# Patient Record
Sex: MALE | Race: WHITE | NOT HISPANIC OR LATINO | Employment: FULL TIME | URBAN - METROPOLITAN AREA
[De-identification: names, ages, dates, MRNs, and addresses within clinical notes are randomized per-mention and may not be internally consistent; named-entity substitution may affect disease eponyms.]

---

## 2021-04-14 DIAGNOSIS — Z23 ENCOUNTER FOR IMMUNIZATION: ICD-10-CM

## 2022-09-22 ENCOUNTER — APPOINTMENT (OUTPATIENT)
Dept: LAB | Facility: HOSPITAL | Age: 57
End: 2022-09-22
Attending: PHYSICIAN ASSISTANT
Payer: COMMERCIAL

## 2022-09-22 ENCOUNTER — TRANSCRIBE ORDERS (OUTPATIENT)
Dept: REGISTRATION | Facility: HOSPITAL | Age: 57
End: 2022-09-22

## 2022-09-22 ENCOUNTER — HOSPITAL ENCOUNTER (OUTPATIENT)
Dept: CARDIOLOGY | Facility: HOSPITAL | Age: 57
Discharge: HOME | End: 2022-09-22
Attending: PHYSICIAN ASSISTANT
Payer: COMMERCIAL

## 2022-09-22 DIAGNOSIS — Z01.818 ENCOUNTER FOR OTHER PREPROCEDURAL EXAMINATION: ICD-10-CM

## 2022-09-22 DIAGNOSIS — Z01.818 ENCOUNTER FOR OTHER PREPROCEDURAL EXAMINATION: Primary | ICD-10-CM

## 2022-09-22 LAB
ABO + RH BLD: NORMAL
ALBUMIN SERPL-MCNC: 3.7 G/DL (ref 3.4–5)
ALP SERPL-CCNC: 69 IU/L (ref 35–126)
ALT SERPL-CCNC: 25 IU/L (ref 16–63)
ANION GAP SERPL CALC-SCNC: 8 MEQ/L (ref 3–15)
APTT PPP: 23 SEC (ref 23–35)
AST SERPL-CCNC: 20 IU/L (ref 15–41)
BILIRUB SERPL-MCNC: 0.8 MG/DL (ref 0.3–1.2)
BLD GP AB SCN SERPL QL: NEGATIVE
BLOOD BANK CMNT PATIENT-IMP: NORMAL
BUN SERPL-MCNC: 22 MG/DL (ref 8–20)
CALCIUM SERPL-MCNC: 9.4 MG/DL (ref 8.9–10.3)
CHLORIDE SERPL-SCNC: 103 MEQ/L (ref 98–109)
CO2 SERPL-SCNC: 29 MEQ/L (ref 22–32)
CREAT SERPL-MCNC: 1.4 MG/DL (ref 0.8–1.3)
CRP SERPL-MCNC: 8.6 MG/L
D AG BLD QL: POSITIVE
ERYTHROCYTE [DISTWIDTH] IN BLOOD BY AUTOMATED COUNT: 14.2 % (ref 11.6–14.4)
ERYTHROCYTE [SEDIMENTATION RATE] IN BLOOD BY WESTERGREN METHOD: <3 MM/HR
EST. AVERAGE GLUCOSE BLD GHB EST-MCNC: 111 MG/DL
GFR SERPL CREATININE-BSD FRML MDRD: 52.1 ML/MIN/1.73M*2
GLUCOSE SERPL-MCNC: 92 MG/DL (ref 70–99)
HBA1C MFR BLD HPLC: 5.5 %
HCT VFR BLDCO AUTO: 41.9 % (ref 40.1–51)
HGB BLD-MCNC: 14 G/DL (ref 13.7–17.5)
INR PPP: 1
LABORATORY COMMENT REPORT: NORMAL
MCH RBC QN AUTO: 31.6 PG (ref 28–33.2)
MCHC RBC AUTO-ENTMCNC: 33.4 G/DL (ref 32.2–36.5)
MCV RBC AUTO: 94.6 FL (ref 83–98)
PDW BLD AUTO: 10.4 FL (ref 9.4–12.4)
PLATELET # BLD AUTO: 232 K/UL (ref 150–350)
POTASSIUM SERPL-SCNC: 4.7 MEQ/L (ref 3.6–5.1)
PROT SERPL-MCNC: 5.4 G/DL (ref 6–8.2)
PROTHROMBIN TIME: 13.2 SEC (ref 12.2–14.5)
RBC # BLD AUTO: 4.43 M/UL (ref 4.5–5.8)
SODIUM SERPL-SCNC: 140 MEQ/L (ref 136–144)
SPECIMEN EXP DATE BLD: NORMAL
WBC # BLD AUTO: 9.94 K/UL (ref 3.8–10.5)

## 2022-09-22 PROCEDURE — 85610 PROTHROMBIN TIME: CPT

## 2022-09-22 PROCEDURE — 85027 COMPLETE CBC AUTOMATED: CPT

## 2022-09-22 PROCEDURE — 85730 THROMBOPLASTIN TIME PARTIAL: CPT

## 2022-09-22 PROCEDURE — 85652 RBC SED RATE AUTOMATED: CPT

## 2022-09-22 PROCEDURE — 86140 C-REACTIVE PROTEIN: CPT

## 2022-09-22 PROCEDURE — 86900 BLOOD TYPING SEROLOGIC ABO: CPT

## 2022-09-22 PROCEDURE — 83036 HEMOGLOBIN GLYCOSYLATED A1C: CPT

## 2022-09-22 PROCEDURE — 86901 BLOOD TYPING SEROLOGIC RH(D): CPT

## 2022-09-22 PROCEDURE — 86850 RBC ANTIBODY SCREEN: CPT

## 2022-09-22 PROCEDURE — 36415 COLL VENOUS BLD VENIPUNCTURE: CPT

## 2022-09-22 PROCEDURE — 93005 ELECTROCARDIOGRAM TRACING: CPT

## 2022-09-22 PROCEDURE — 80053 COMPREHEN METABOLIC PANEL: CPT

## 2022-09-23 ENCOUNTER — TRANSCRIBE ORDERS (OUTPATIENT)
Dept: REGISTRATION | Facility: HOSPITAL | Age: 57
End: 2022-09-23

## 2022-09-23 ENCOUNTER — APPOINTMENT (OUTPATIENT)
Dept: PREADMISSION TESTING | Facility: HOSPITAL | Age: 57
End: 2022-09-23
Payer: COMMERCIAL

## 2022-09-23 ENCOUNTER — APPOINTMENT (OUTPATIENT)
Dept: LAB | Facility: HOSPITAL | Age: 57
End: 2022-09-23
Attending: ORTHOPAEDIC SURGERY
Payer: COMMERCIAL

## 2022-09-23 VITALS — HEIGHT: 69 IN | BODY MASS INDEX: 25.03 KG/M2 | WEIGHT: 169 LBS

## 2022-09-23 DIAGNOSIS — Z01.818 ENCOUNTER FOR OTHER PREPROCEDURAL EXAMINATION: ICD-10-CM

## 2022-09-23 DIAGNOSIS — T84.84XA PAIN DUE TO INTERNAL ORTHOPEDIC PROSTHETIC DEVICES, IMPLANTS AND GRAFTS, INITIAL ENCOUNTER (CMS/HCC): Primary | ICD-10-CM

## 2022-09-23 DIAGNOSIS — T84.84XA PAIN DUE TO INTERNAL ORTHOPEDIC PROSTHETIC DEVICES, IMPLANTS AND GRAFTS, INITIAL ENCOUNTER (CMS/HCC): ICD-10-CM

## 2022-09-23 LAB
ATRIAL RATE: 65
P AXIS: 16
PR INTERVAL: 192
QRS DURATION: 78
QT INTERVAL: 392
QTC CALCULATION(BAZETT): 407
R AXIS: 1
SARS-COV-2 RNA RESP QL NAA+PROBE: NEGATIVE
T WAVE AXIS: 41
VENTRICULAR RATE: 65

## 2022-09-23 PROCEDURE — U0003 INFECTIOUS AGENT DETECTION BY NUCLEIC ACID (DNA OR RNA); SEVERE ACUTE RESPIRATORY SYNDROME CORONAVIRUS 2 (SARS-COV-2) (CORONAVIRUS DISEASE [COVID-19]), AMPLIFIED PROBE TECHNIQUE, MAKING USE OF HIGH THROUGHPUT TECHNOLOGIES AS DESCRIBED BY CMS-2020-01-R: HCPCS

## 2022-09-23 PROCEDURE — C9803 HOPD COVID-19 SPEC COLLECT: HCPCS

## 2022-09-23 RX ORDER — LATANOPROST 50 UG/ML
SOLUTION/ DROPS OPHTHALMIC DAILY
COMMUNITY

## 2022-09-23 RX ORDER — BACLOFEN 10 MG/1
10 TABLET ORAL
COMMUNITY
Start: 2020-01-01

## 2022-09-23 RX ORDER — ALLOPURINOL 300 MG/1
1 TABLET ORAL
COMMUNITY
Start: 2021-01-01

## 2022-09-23 RX ORDER — SENNOSIDES 8.6 MG/1
1 TABLET ORAL DAILY
COMMUNITY

## 2022-09-23 RX ORDER — MONTELUKAST SODIUM 10 MG/1
10 TABLET ORAL
COMMUNITY
Start: 2022-09-16

## 2022-09-23 RX ORDER — DOCUSATE SODIUM 100 MG/1
100 CAPSULE, LIQUID FILLED ORAL 2 TIMES DAILY
COMMUNITY

## 2022-09-23 RX ORDER — LISINOPRIL 40 MG/1
40 TABLET ORAL DAILY
COMMUNITY
Start: 2022-09-16

## 2022-09-23 RX ORDER — TRIAMCINOLONE ACETONIDE 55 UG/1
SPRAY, METERED NASAL
COMMUNITY

## 2022-09-23 RX ORDER — FAMOTIDINE 40 MG/1
40 TABLET, FILM COATED ORAL 2 TIMES DAILY
COMMUNITY
Start: 2020-01-01 | End: 2022-12-16

## 2022-09-23 RX ORDER — ACETAMINOPHEN 500 MG
1 TABLET ORAL
COMMUNITY

## 2022-09-23 RX ORDER — CETIRIZINE HYDROCHLORIDE 10 MG/1
TABLET ORAL
COMMUNITY

## 2022-09-23 RX ORDER — LAMOTRIGINE 100 MG/1
100 TABLET ORAL
COMMUNITY
Start: 2020-01-01

## 2022-09-23 RX ORDER — ALBUTEROL SULFATE 90 UG/1
2 INHALANT RESPIRATORY (INHALATION)
COMMUNITY
Start: 2022-02-12

## 2022-09-23 RX ORDER — ASPIRIN 81 MG/1
1 TABLET ORAL DAILY
Status: ON HOLD | COMMUNITY
End: 2022-09-28 | Stop reason: SDUPTHER

## 2022-09-23 RX ORDER — HYDROCHLOROTHIAZIDE 12.5 MG/1
CAPSULE ORAL
COMMUNITY
Start: 2020-01-01

## 2022-09-23 RX ORDER — LORAZEPAM 0.5 MG/1
0.5 TABLET ORAL
COMMUNITY
Start: 2020-01-01

## 2022-09-23 RX ORDER — LAMOTRIGINE 25 MG/1
1 TABLET, EXTENDED RELEASE ORAL DAILY
COMMUNITY
Start: 2022-08-16

## 2022-09-23 RX ORDER — ROSUVASTATIN CALCIUM 10 MG/1
10 TABLET, COATED ORAL DAILY
COMMUNITY
Start: 2022-09-16 | End: 2023-09-16

## 2022-09-23 NOTE — PRE-PROCEDURE INSTRUCTIONS
1. We will call you between 3 pm and 7 pm on 09/26/22 to inform you of arrival time for your procedure. If you do not hear by 6PM, please call [phone] for arrival time.     2. Please arrive through the Main Entrance of the [facility] (across from the parking garage) and report to the Surgery Registration Desk on the day of your procedure.    3. Please follow the following fasting guidelines:     No solid food EIGHT HOURS prior to surgery.  Unlimited clear liquids, meaning water or PLAIN black coffee WITHOUT any milk, cream, sugar, or sweetener are permitted up to TWO HOURS prior to arrival at the hospital.    4. Early on the morning of the procedure please take your usual dose of the listed medications with a sip of water:  Allopurinol  Zyrtec  pepcid  Lamotrigine  No lisinopril  Ativan  singulair  No hctz  5. Other Instructions: You may brush your teeth the morning of the procedure. Rinse and spit, do not swallow.  Bring a list of your medications with dosages.  Use surgical wash as directed.     6. If you develop a cold, cough, fever, rash, or other symptom prior to the day of the procedure, please report it to your physician immediately.    7. If you need to cancel the procedure for any reason, please contact your physician.    8. Make arrangements to have someone drive you home from the procedure. If you have not arranged for transportation home, your surgery may be cancelled.     9. You may not take public transportation unless accompanied by a responsible person.    10. You may not drive a car or operate complex or potentially dangerous machinery for 24 hours following anesthesia and/or sedation.    11.  If it is medically necessary for you to have a longer stay, you will be informed as soon as the decision is made.    12. Only bring essential items to the hospital.  Do not wear or bring anything of value to the hospital including jewelry of any kind, money, or wallet. Do not wear make-up or contact lenses.   DO NOT BRING MEDICATIONS FROM HOME unless instructed to do so. DO bring your hearing aids, glasses, and a case    13. No lotion, creams, powders, or oils on skin the morning of procedure     14. Dress in comfortable clothes.    15.  If instructed, please bring a copy of your Advanced Directive (Living Will/Durable Power of ) on the day of your procedure.     16. Patients need to quarantine from the time of PAT COVID test to day of surgery, regardless of COVID vaccine status.      17. Ensuring your safety at all times is a very important part of our [facility] Culture of Safety. After having surgery and sedation, you are at risk for falling and balance issues. Although you may feel awake, the effects of the medication can last up to 24 hours after anesthesia. If you need to use the bathroom during your recovery period, nursing staff will escort you there and stay with you to ensure your safety.    18. Refrain from drinking alcohol and smoking cigarettes for 24 hours prior to surgery.    19. Shower with antibacterial soap (Dial) the night before and morning of your procedure.  If your procedure indicates the need for CHG antiseptic wash (Bactoshield or Hibiclens), please use this instead and follow instructions as discussed at the time of your Pre-Admission Testing phone interview or visit.    Above instructions reviewed with patient and patient acknowledges understanding.    Form explained by: Gisselle Hernández RN

## 2022-09-26 ENCOUNTER — ANESTHESIA EVENT (OUTPATIENT)
Dept: OPERATING ROOM | Facility: HOSPITAL | Age: 57
Setting detail: HOSPITAL OUTPATIENT SURGERY
End: 2022-09-26
Payer: COMMERCIAL

## 2022-09-27 ENCOUNTER — HOSPITAL ENCOUNTER (OUTPATIENT)
Facility: HOSPITAL | Age: 57
Discharge: HOME | End: 2022-09-28
Attending: ORTHOPAEDIC SURGERY | Admitting: ORTHOPAEDIC SURGERY
Payer: COMMERCIAL

## 2022-09-27 ENCOUNTER — APPOINTMENT (OUTPATIENT)
Dept: RADIOLOGY | Facility: HOSPITAL | Age: 57
End: 2022-09-27
Attending: ORTHOPAEDIC SURGERY
Payer: COMMERCIAL

## 2022-09-27 ENCOUNTER — ANESTHESIA (OUTPATIENT)
Dept: OPERATING ROOM | Facility: HOSPITAL | Age: 57
Setting detail: HOSPITAL OUTPATIENT SURGERY
End: 2022-09-27
Payer: COMMERCIAL

## 2022-09-27 DIAGNOSIS — T84.84XA PAIN DUE TO TOTAL RIGHT KNEE REPLACEMENT, INITIAL ENCOUNTER (CMS/HCC): ICD-10-CM

## 2022-09-27 DIAGNOSIS — Z96.651 PAIN DUE TO TOTAL RIGHT KNEE REPLACEMENT, INITIAL ENCOUNTER (CMS/HCC): ICD-10-CM

## 2022-09-27 PROCEDURE — C1776 JOINT DEVICE (IMPLANTABLE): HCPCS | Performed by: ORTHOPAEDIC SURGERY

## 2022-09-27 PROCEDURE — 63600000 HC DRUGS/DETAIL CODE: Performed by: ORTHOPAEDIC SURGERY

## 2022-09-27 PROCEDURE — 73560 X-RAY EXAM OF KNEE 1 OR 2: CPT | Mod: RT

## 2022-09-27 PROCEDURE — 0SRC069 REPLACEMENT OF RIGHT KNEE JOINT WITH OXIDIZED ZIRCONIUM ON POLYETHYLENE SYNTHETIC SUBSTITUTE, CEMENTED, OPEN APPROACH: ICD-10-PCS | Performed by: ORTHOPAEDIC SURGERY

## 2022-09-27 PROCEDURE — 25000000 HC PHARMACY GENERAL: Performed by: NURSE ANESTHETIST, CERTIFIED REGISTERED

## 2022-09-27 PROCEDURE — 63600000 HC DRUGS/DETAIL CODE: Performed by: NURSE ANESTHETIST, CERTIFIED REGISTERED

## 2022-09-27 PROCEDURE — C1713 ANCHOR/SCREW BN/BN,TIS/BN: HCPCS | Performed by: ORTHOPAEDIC SURGERY

## 2022-09-27 PROCEDURE — 27200000 HC STERILE SUPPLY: Performed by: ORTHOPAEDIC SURGERY

## 2022-09-27 PROCEDURE — 25000000 HC PHARMACY GENERAL: Performed by: ORTHOPAEDIC SURGERY

## 2022-09-27 PROCEDURE — 36415 COLL VENOUS BLD VENIPUNCTURE: CPT | Performed by: ORTHOPAEDIC SURGERY

## 2022-09-27 PROCEDURE — 0SPC0JZ REMOVAL OF SYNTHETIC SUBSTITUTE FROM RIGHT KNEE JOINT, OPEN APPROACH: ICD-10-PCS | Performed by: ORTHOPAEDIC SURGERY

## 2022-09-27 PROCEDURE — 36000015 HC OR LEVEL 5 EA ADDL MIN: Performed by: ORTHOPAEDIC SURGERY

## 2022-09-27 PROCEDURE — 87070 CULTURE OTHR SPECIMN AEROBIC: CPT | Performed by: ORTHOPAEDIC SURGERY

## 2022-09-27 PROCEDURE — 25000000 HC PHARMACY GENERAL: Performed by: ANESTHESIOLOGY

## 2022-09-27 PROCEDURE — 71000011 HC PACU PHASE 1 EA ADDL MIN: Performed by: ORTHOPAEDIC SURGERY

## 2022-09-27 PROCEDURE — 25800000 HC PHARMACY IV SOLUTIONS: Performed by: ORTHOPAEDIC SURGERY

## 2022-09-27 PROCEDURE — 36000005 HC OR LEVEL 5 INITIAL 30MIN: Performed by: ORTHOPAEDIC SURGERY

## 2022-09-27 PROCEDURE — 71000001 HC PACU PHASE 1 INITIAL 30MIN: Performed by: ORTHOPAEDIC SURGERY

## 2022-09-27 PROCEDURE — 37000010 HC ANESTHESIA SPINAL: Performed by: ORTHOPAEDIC SURGERY

## 2022-09-27 PROCEDURE — 63700000 HC SELF-ADMINISTRABLE DRUG: Performed by: ORTHOPAEDIC SURGERY

## 2022-09-27 PROCEDURE — 63600000 HC DRUGS/DETAIL CODE: Performed by: ANESTHESIOLOGY

## 2022-09-27 DEVICE — LEGION CONSTRAINED ARTICULAR                                    INSERT 9MM SIZE 5-6
Type: IMPLANTABLE DEVICE | Site: KNEE | Status: FUNCTIONAL
Brand: LEGION

## 2022-09-27 DEVICE — LEGION REVISION TIBIAL BASEPLATE                                    SIZE 5 RIGHT
Type: IMPLANTABLE DEVICE | Site: KNEE | Status: FUNCTIONAL
Brand: LEGION

## 2022-09-27 DEVICE — IMPLANTABLE DEVICE: Type: IMPLANTABLE DEVICE | Site: KNEE | Status: FUNCTIONAL

## 2022-09-27 DEVICE — CEMENT BONE SIMPLEX W/TOBRAMYCIN: Type: IMPLANTABLE DEVICE | Site: KNEE | Status: FUNCTIONAL

## 2022-09-27 DEVICE — LEGION CEMENTED STEM 16MM X 120MM STRAIGHT
Type: IMPLANTABLE DEVICE | Site: KNEE | Status: FUNCTIONAL
Brand: LEGION

## 2022-09-27 DEVICE — LEGION COBALT CHROME CONSTRAINED                                    FEMORAL SIZE 6 RIGHT
Type: IMPLANTABLE DEVICE | Site: KNEE | Status: FUNCTIONAL
Brand: LEGION

## 2022-09-27 DEVICE — CEMENTED STEM STRAIGHT 14MMX120MM: Type: IMPLANTABLE DEVICE | Site: KNEE | Status: FUNCTIONAL

## 2022-09-27 RX ORDER — DEXAMETHASONE SODIUM PHOSPHATE 4 MG/ML
10 INJECTION, SOLUTION INTRA-ARTICULAR; INTRALESIONAL; INTRAMUSCULAR; INTRAVENOUS; SOFT TISSUE DAILY
Status: DISCONTINUED | OUTPATIENT
Start: 2022-09-27 | End: 2022-09-28 | Stop reason: HOSPADM

## 2022-09-27 RX ORDER — SODIUM CHLORIDE 9 MG/ML
INJECTION, SOLUTION INTRAVENOUS CONTINUOUS
Status: DISCONTINUED | OUTPATIENT
Start: 2022-09-27 | End: 2022-09-28 | Stop reason: HOSPADM

## 2022-09-27 RX ORDER — VANCOMYCIN HYDROCHLORIDE 1 G/20ML
INJECTION, POWDER, LYOPHILIZED, FOR SOLUTION INTRAVENOUS
Status: DISCONTINUED | OUTPATIENT
Start: 2022-09-27 | End: 2022-09-27 | Stop reason: HOSPADM

## 2022-09-27 RX ORDER — ONDANSETRON 8 MG/1
8 TABLET, ORALLY DISINTEGRATING ORAL ONCE
Status: COMPLETED | OUTPATIENT
Start: 2022-09-27 | End: 2022-09-27

## 2022-09-27 RX ORDER — PROPOFOL 10 MG/ML
INJECTION, EMULSION INTRAVENOUS CONTINUOUS PRN
Status: DISCONTINUED | OUTPATIENT
Start: 2022-09-27 | End: 2022-09-28 | Stop reason: SURG

## 2022-09-27 RX ORDER — PREGABALIN 150 MG/1
150 CAPSULE ORAL ONCE
Status: COMPLETED | OUTPATIENT
Start: 2022-09-27 | End: 2022-09-27

## 2022-09-27 RX ORDER — IBUPROFEN 200 MG
16-32 TABLET ORAL AS NEEDED
Status: DISCONTINUED | OUTPATIENT
Start: 2022-09-27 | End: 2022-09-28 | Stop reason: HOSPADM

## 2022-09-27 RX ORDER — CELECOXIB 200 MG/1
400 CAPSULE ORAL ONCE
Status: COMPLETED | OUTPATIENT
Start: 2022-09-27 | End: 2022-09-27

## 2022-09-27 RX ORDER — CEFAZOLIN SODIUM/WATER 2 G/20 ML
2 SYRINGE (ML) INTRAVENOUS ONCE
Status: COMPLETED | OUTPATIENT
Start: 2022-09-27 | End: 2022-09-27

## 2022-09-27 RX ORDER — KETOROLAC TROMETHAMINE 30 MG/ML
30 INJECTION, SOLUTION INTRAMUSCULAR; INTRAVENOUS
Status: DISCONTINUED | OUTPATIENT
Start: 2022-09-27 | End: 2022-09-28 | Stop reason: HOSPADM

## 2022-09-27 RX ORDER — DEXTROSE 50 % IN WATER (D50W) INTRAVENOUS SYRINGE
25 AS NEEDED
Status: DISCONTINUED | OUTPATIENT
Start: 2022-09-27 | End: 2022-09-28 | Stop reason: HOSPADM

## 2022-09-27 RX ORDER — ONDANSETRON HYDROCHLORIDE 2 MG/ML
4 INJECTION, SOLUTION INTRAVENOUS
Status: DISCONTINUED | OUTPATIENT
Start: 2022-09-27 | End: 2022-09-27 | Stop reason: HOSPADM

## 2022-09-27 RX ORDER — PROPOFOL 10 MG/ML
INJECTION, EMULSION INTRAVENOUS AS NEEDED
Status: DISCONTINUED | OUTPATIENT
Start: 2022-09-27 | End: 2022-09-28 | Stop reason: SURG

## 2022-09-27 RX ORDER — MIDAZOLAM HYDROCHLORIDE 2 MG/2ML
INJECTION, SOLUTION INTRAMUSCULAR; INTRAVENOUS AS NEEDED
Status: DISCONTINUED | OUTPATIENT
Start: 2022-09-27 | End: 2022-09-28

## 2022-09-27 RX ORDER — HYDROMORPHONE HYDROCHLORIDE 1 MG/ML
0.25 INJECTION, SOLUTION INTRAMUSCULAR; INTRAVENOUS; SUBCUTANEOUS EVERY 4 HOURS PRN
Status: DISCONTINUED | OUTPATIENT
Start: 2022-09-27 | End: 2022-09-28

## 2022-09-27 RX ORDER — TRAMADOL HYDROCHLORIDE 50 MG/1
50 TABLET ORAL 4 TIMES DAILY
Status: DISCONTINUED | OUTPATIENT
Start: 2022-09-27 | End: 2022-09-28 | Stop reason: HOSPADM

## 2022-09-27 RX ORDER — POLYETHYLENE GLYCOL 3350 17 G/17G
17 POWDER, FOR SOLUTION ORAL DAILY
Status: DISCONTINUED | OUTPATIENT
Start: 2022-09-28 | End: 2022-09-28 | Stop reason: HOSPADM

## 2022-09-27 RX ORDER — NAPROXEN SODIUM 220 MG/1
81 TABLET, FILM COATED ORAL 2 TIMES DAILY
Status: DISCONTINUED | OUTPATIENT
Start: 2022-09-27 | End: 2022-09-28 | Stop reason: HOSPADM

## 2022-09-27 RX ORDER — AMOXICILLIN 250 MG
1 CAPSULE ORAL 2 TIMES DAILY
Status: DISCONTINUED | OUTPATIENT
Start: 2022-09-27 | End: 2022-09-28 | Stop reason: HOSPADM

## 2022-09-27 RX ORDER — LIDOCAINE HYDROCHLORIDE 10 MG/ML
INJECTION, SOLUTION INFILTRATION; PERINEURAL AS NEEDED
Status: DISCONTINUED | OUTPATIENT
Start: 2022-09-27 | End: 2022-09-28 | Stop reason: SURG

## 2022-09-27 RX ORDER — OXYCODONE HYDROCHLORIDE 5 MG/1
5 TABLET ORAL EVERY 4 HOURS PRN
Status: DISCONTINUED | OUTPATIENT
Start: 2022-09-27 | End: 2022-09-28 | Stop reason: HOSPADM

## 2022-09-27 RX ORDER — OXYCODONE HCL 20 MG/1
20 TABLET, FILM COATED, EXTENDED RELEASE ORAL ONCE
Status: COMPLETED | OUTPATIENT
Start: 2022-09-27 | End: 2022-09-27

## 2022-09-27 RX ORDER — ALUMINUM HYDROXIDE, MAGNESIUM HYDROXIDE, AND SIMETHICONE 1200; 120; 1200 MG/30ML; MG/30ML; MG/30ML
30 SUSPENSION ORAL EVERY 4 HOURS PRN
Status: DISCONTINUED | OUTPATIENT
Start: 2022-09-27 | End: 2022-09-28 | Stop reason: HOSPADM

## 2022-09-27 RX ORDER — DEXAMETHASONE SODIUM PHOSPHATE 4 MG/ML
INJECTION, SOLUTION INTRA-ARTICULAR; INTRALESIONAL; INTRAMUSCULAR; INTRAVENOUS; SOFT TISSUE AS NEEDED
Status: DISCONTINUED | OUTPATIENT
Start: 2022-09-27 | End: 2022-09-28 | Stop reason: SURG

## 2022-09-27 RX ORDER — ONDANSETRON HYDROCHLORIDE 2 MG/ML
4 INJECTION, SOLUTION INTRAVENOUS EVERY 8 HOURS PRN
Status: DISCONTINUED | OUTPATIENT
Start: 2022-09-27 | End: 2022-09-28 | Stop reason: HOSPADM

## 2022-09-27 RX ORDER — FAMOTIDINE 20 MG/1
20 TABLET, FILM COATED ORAL ONCE
Status: DISCONTINUED | OUTPATIENT
Start: 2022-09-27 | End: 2022-09-27

## 2022-09-27 RX ORDER — BUPIVACAINE HYDROCHLORIDE 7.5 MG/ML
INJECTION, SOLUTION INTRASPINAL
Status: COMPLETED | OUTPATIENT
Start: 2022-09-27 | End: 2022-09-27

## 2022-09-27 RX ORDER — FENTANYL CITRATE 50 UG/ML
50 INJECTION, SOLUTION INTRAMUSCULAR; INTRAVENOUS
Status: DISCONTINUED | OUTPATIENT
Start: 2022-09-27 | End: 2022-09-27 | Stop reason: HOSPADM

## 2022-09-27 RX ORDER — SODIUM CHLORIDE 0.9 G/100ML
INJECTION, SOLUTION IRRIGATION
Status: DISCONTINUED | OUTPATIENT
Start: 2022-09-27 | End: 2022-09-27 | Stop reason: HOSPADM

## 2022-09-27 RX ORDER — DEXTROSE 40 %
15-30 GEL (GRAM) ORAL AS NEEDED
Status: DISCONTINUED | OUTPATIENT
Start: 2022-09-27 | End: 2022-09-28 | Stop reason: HOSPADM

## 2022-09-27 RX ADMIN — VANCOMYCIN HYDROCHLORIDE 1 G: 1 INJECTION, POWDER, LYOPHILIZED, FOR SOLUTION INTRAVENOUS at 14:28

## 2022-09-27 RX ADMIN — PREGABALIN 150 MG: 150 CAPSULE ORAL at 13:59

## 2022-09-27 RX ADMIN — TRANEXAMIC ACID 1500 MG: 100 INJECTION, SOLUTION INTRAVENOUS at 15:25

## 2022-09-27 RX ADMIN — BUPIVACAINE HYDROCHLORIDE IN DEXTROSE 2 ML: 7.5 INJECTION, SOLUTION SUBARACHNOID at 15:39

## 2022-09-27 RX ADMIN — SODIUM CHLORIDE: 9 INJECTION, SOLUTION INTRAVENOUS at 15:28

## 2022-09-27 RX ADMIN — PROPOFOL 20 MG: 10 INJECTION, EMULSION INTRAVENOUS at 15:35

## 2022-09-27 RX ADMIN — ONDANSETRON 8 MG: 8 TABLET, ORALLY DISINTEGRATING ORAL at 13:59

## 2022-09-27 RX ADMIN — Medication 2 G: at 15:36

## 2022-09-27 RX ADMIN — SODIUM CHLORIDE: 9 INJECTION, SOLUTION INTRAVENOUS at 21:41

## 2022-09-27 RX ADMIN — TRAMADOL HYDROCHLORIDE 50 MG: 50 TABLET, COATED ORAL at 23:22

## 2022-09-27 RX ADMIN — OXYCODONE HYDROCHLORIDE 5 MG: 5 TABLET ORAL at 23:22

## 2022-09-27 RX ADMIN — SODIUM CHLORIDE: 9 INJECTION, SOLUTION INTRAVENOUS at 14:34

## 2022-09-27 RX ADMIN — CELECOXIB 400 MG: 200 CAPSULE ORAL at 13:59

## 2022-09-27 RX ADMIN — MIDAZOLAM HYDROCHLORIDE 2 MG: 1 INJECTION, SOLUTION INTRAMUSCULAR; INTRAVENOUS at 15:25

## 2022-09-27 RX ADMIN — PROPOFOL 70 MCG/KG/MIN: 10 INJECTION, EMULSION INTRAVENOUS at 15:38

## 2022-09-27 RX ADMIN — KETOROLAC TROMETHAMINE 30 MG: 30 INJECTION, SOLUTION INTRAMUSCULAR at 23:21

## 2022-09-27 RX ADMIN — LIDOCAINE HYDROCHLORIDE 5 ML: 10 INJECTION, SOLUTION INFILTRATION; PERINEURAL at 15:35

## 2022-09-27 RX ADMIN — DEXAMETHASONE SODIUM PHOSPHATE 4 MG: 4 INJECTION, SOLUTION INTRA-ARTICULAR; INTRALESIONAL; INTRAMUSCULAR; INTRAVENOUS; SOFT TISSUE at 15:42

## 2022-09-27 RX ADMIN — ASPIRIN 81 MG CHEWABLE TABLET 81 MG: 81 TABLET CHEWABLE at 23:21

## 2022-09-27 RX ADMIN — OXYCODONE HYDROCHLORIDE 20 MG: 20 TABLET, FILM COATED, EXTENDED RELEASE ORAL at 13:59

## 2022-09-27 RX ADMIN — SENNOSIDES AND DOCUSATE SODIUM 1 TABLET: 50; 8.6 TABLET ORAL at 23:22

## 2022-09-27 RX ADMIN — FENTANYL CITRATE 50 MCG: 50 INJECTION, SOLUTION INTRAMUSCULAR; INTRAVENOUS at 20:45

## 2022-09-27 ASSESSMENT — COPD QUESTIONNAIRES: COPD: 1

## 2022-09-27 ASSESSMENT — ENCOUNTER SYMPTOMS: DEPRESSION: 1

## 2022-09-27 ASSESSMENT — PATIENT HEALTH QUESTIONNAIRE - PHQ9: SUM OF ALL RESPONSES TO PHQ9 QUESTIONS 1 & 2: 0

## 2022-09-27 NOTE — BRIEF OP NOTE
RIGHT KNEE TOTAL REVISION (R) Procedure Note    Procedure:    RIGHT KNEE TOTAL REVISION  CPT(R) Code:  58044 - NM REVISE KNEE JOINT REPLACE,ALL PARTS      Pre-op Diagnosis     * Pain due to total right knee replacement, initial encounter (CMS/[facility]) [T84.84XA, Z96.651]       Post-op Diagnosis     * Pain due to total right knee replacement, initial encounter (CMS/[facility]) [T84.84XA, Z96.651]    Surgeon(s) and Role:     * Himanshu Valdez MD - Primary     * Tristin Jackson DO - Resident - Assisting    Anesthesia: Choice    Staff:   Circulator: Susi Martinez, ROSA; Kylah Henson, ROSA; Lonnie Chang, ROSA; Eulalia Watts RN  Scrub Person: Ilana Juárez    Procedure Details   Revision right TKR    Estimated Blood Loss: No blood loss documented.    Specimens:                Order Name Source Comment Collection Info Order Time   ANAEROBIC CULTURE / SMEAR (INCLUDES AEROBIC CULTURE) Knee, Right Pre-op diagnosis:  Painful Total Knee T84.84xa Collected By: Himanshu Valdez MD 9/27/2022  4:12 PM     Release to patient   Immediate        REPEAT ABO/RH (TYPE CHECK) Blood, Venous   9/27/2022  2:57 PM     Release to patient   Immediate              Drains: * No LDAs found *    Implants:   Implant Name Type Inv. Item Serial No.  Lot No. LRB No. Used Action   CEMENT BONE SIMPLEX W/TOBRAMYCIN - [id number]  CEMENT BONE SIMPLEX W/TOBRAMYCIN  [employer] [id number] Right 3 Implanted   Prep-IM Enhanced Total Hip Preparation Kit    [facility] [id number] Right 1 Implanted   TIBIAL BASEPLATE LEGION REV SIZE 5/RIGHT - [id number]  TIBIAL BASEPLATE LEGION REV SIZE 5/RIGHT  [place] [id number] Right 1 Implanted   FEMORAL COMPONENT CONSTRAINED SIZE 6 RIGHT - [id number]  FEMORAL COMPONENT CONSTRAINED SIZE 6 [facility] [id number] Right 1 Implanted   SCREW-ON FEMORAL WEDGE 15MM SIZE 1-2 - [id number]  SCREW-ON FEMORAL WEDGE 15MM SIZE 1-2  [employer] [place] [id number] Right 1 Implanted   STEM CEMENTED LEGION STR 16 X 120 -  [id number]  STEM CEMENTED LEGION STR 16 X 120  [employer] [id number] Right 1 Implanted   ARTICULAR INSERT 9MM SIZE 5-6 CONSTRAINED - [id number]  ARTICULAR INSERT 9MM SIZE 5-6 CONSTRAINED  [employer] [id number] Right 1 Implanted   CEMENTED STEM STRAIGHT [id number] - [id number]  CEMENTED STEM STRAIGHT [id number]  [employer] [id number] Right 1 Implanted              Complications:  None; patient tolerated the procedure well.           Disposition: PACU - hemodynamically stable.           Condition: stable    Himanshu Valdez MD  Phone Number: [phone]

## 2022-09-27 NOTE — ANESTHESIOLOGIST PRE-PROCEDURE ATTESTATION
Pre-Procedure Patient Identification:  I am the Primary Anesthesiologist and have identified the patient on 09/27/22 at 2:59 PM.   I have confirmed the procedure(s) will be performed by the following surgeon/proceduralist Himanshu Valdez MD.

## 2022-09-27 NOTE — OR SURGEON
Pre-Procedure patient identification:  I am the primary operating surgeon/proceduralist and I have identified the patient and confirmed laterality is right on 09/27/22 at 2:44 PM Himanshu Valdez MD  Phone Number: [phone]

## 2022-09-27 NOTE — OP NOTE
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  Next the right knee was prepped and draped in usual sterile fashion. Next the limb was exsanguinated with an Esmarch bandage and the tourniquet was inflated to 350 mmHg. An anterior approach using the previous incision was carried out through the subcutaneous tissue down to the level of the fascia. The old scar was excised.  The fascia was then divided in a standard median parapatellar patella arthrotomy. Next the patella was inspected and found to be in excellent condition and left in place.  At this point in time scar surrounding the joint was removed and a complete synovectomy was performed.  At this point in time, using chisels osteotomes and other tools, the femoral and tibial components were removed with minimal bone loss.  Canals were reamed to 16 mm on the femoral side and 14 mm on the tibial side.  At this point in time, the saw was used to do debridements of the distal aspect of the femur and proximal part of the tibia in standard manner to freshen up the bone and normalize the angle of the cuts.  A trial reduction was now performed with the size 6 femur, with a 16 x 120 mm stem, the size 4 tibia, with a 14 x 120 mm stem, and a size 9 mm polyethylene insert.  A single distal medial augments were used.  These components were found to be quite satisfactory in terms of range of motion and stability and patellar tracking.  These size components were accepted, the canals were plugged, and this point in time cement was mixed and infiltrated into the canals and the ends of the bones.  Previously mentioned sized components for the Legion total knee replacement system by Smith & Nephewwere cemented into place.    Excess cement was removed from all areas and the knee was then evaluated for range of motion stability, kinematics, balance and patella tracking, all of which were satisfactory. At this point time dilute Betadine solutions placed into the wound for approximately 2 minutes and was then thoroughly irrigated and evacuated and then the wound was closed with #2 Quill the deep layer, O- Quill in the subcutaneous layer, and skin staples into the skin.  Finally prior to the completion of closure, dilute Marcaine solution was infiltrated into the shin-incisional tissues for postoperative pain control. A sterile compressive dressing was then placed and the tourniquet was then released the patient was then transferred to the recovery room in stable condition. I was present for the entire case.

## 2022-09-27 NOTE — DISCHARGE INSTRUCTIONS
KNEE REVISION      MEDICATION:    If you are taking Aspirin:  Enteric coated aspirin 2 times a day for blood clot prevention and take for 6 weeks.  May use over-the-counter Pepcid or Zantac if stomach upset occurs.    PAIN CONTROL:    You will be given a prescription for pain medication. Take your pain medicine as prescribed with food if possible. You can expect to have pain during the healing process from the incision and it will improve.     Opioid pain reliever is to be taken only as needed for pain.    DO NOT DRIVE WHILE TAKING PAIN MEDICATION.    Some patients find that they have some difficulty with constipation after surgery. This is due to a combination of things. Most of this is due to the pain medication you are taking. The pain medications, along with a decrease in activity, can sometimes lead to discomfort from constipation. You should eat plenty of fresh fruits, vegetables, drink fruit juices and drink plenty of water.    INCISION CARE:  Look at incision every day. Keep incision clean and dry.  Maintain Mepilex dressing for 5 days total.  Maintain MARIO stockings. May remove for showering and at night while sleeping.  If you have steri-strips, leave them on until they fall off.  Your staples or stitches will be removed about 18 to 24 days after surgery. Your incision will heal and the swelling and bruising will get better over the next 3 weeks.  If you have glue closing your incision, do NOT pull or pick off.  It will dissolve naturally.  You may shower but no tub baths, swimming, jacuzzi tubs, or any other activity that would require submersion of your incision in water.      Call your doctor if you notice any of the following:  Fever over 101.5 degrees  Drainage from incision  Increased swelling around incision  Increased redness around incision line  Thigh/calf pain or swelling in legs  Chest pain  Chest congestion  Problems with breathing    ACTIVITY:  You may progress to a cane when ready.  You may  put as much weight as you can tolerate on your operative leg (unless instructed otherwise).  Balance rest and activity.    DO NOT SMOKE! Smoking is not healthy for your healing process.  No driving for at least 3 weeks. You must also be off of prescription opioids.    KNEE PRECAUTIONS:  No pillow under the knee.  No twisting or kneeling.    FOLLOW Up:  Call now for an appointment in 3 weeks from your date of surgery with Dr. Valdez. [phone]        *FOR ANY ORTHOPEDIC ISSUES OR CONCERNS THAT NEED TO BE ADDRESSED IN PERSON, YOU MAY REPORT TO THE URGENT CARE LOCATED AT THE [facility] WHICH HAS EVENING AND WEEKEND HOURS, INSTEAD OF REPORTING TO THE EMERGENCY ROOM    For your and/or your 's information, important details about activity and expectations that were reviewed during your hospital stay can be at found in our Discharge Video overview at [email or web address]       [facility] They can be reached at [phone].  They will be out to see you within 24-48 hours.

## 2022-09-27 NOTE — ANESTHESIA PROCEDURE NOTES
Spinal Block    Patient location during procedure: OR  Start time: 9/27/2022 3:33 PM  End time: 9/27/2022 3:37 PM  Staffing  Performed: anesthesiologist   Anesthesiologist: Fabi Perez MD  Resident/CRNA: Mansi Suero CRNA  Reason for block: primary anesthetic  Preanesthetic Checklist  Completed: patient identified, site marked, surgical consent, pre-op evaluation, timeout performed, IV checked, risks and benefits discussed, monitors and equipment checked and sterile field maintained during procedure  Spinal Block  Patient position: sitting  Prep: ChloraPrep and site prepped and draped  Patient monitoring: heart rate, cardiac monitor, continuous pulse ox and blood pressure  Approach: midline  Location: L4-5  Injection technique: single-shot  Needle  Needle type: Sprotte   Needle gauge: 24 G  Needle length: 3.5 in  Assessment  Events: cerebrospinal fluid  Additional Notes  Procedure well tolerated. Vital signs stable.    Medications Administered -   bupivacaine PF (MARCAINE SPINAL) 0.75% intrathecal injection - intrathecal   2 mL - 9/27/2022 3:39:00 PM

## 2022-09-27 NOTE — H&P
Ortho H&P  Admitting Diagnosis:  Pain due to total right knee replacement, initial encounter (CMS/[facility]) [T84.84XA, Z96.651]  HPI       Hard copy H and P is available for review. There are no Changes and we may proceed.

## 2022-09-27 NOTE — ANESTHESIA PREPROCEDURE EVALUATION
Relevant Problems   No relevant active problems       Anesthesia ROS/MED HX      Pulmonary    asthma   COPD  Neuro/Psych    Depression   Anxiety  Cardiovascular   Covid19 Test Reviewed and ECG reviewed  GI/Hepatic   PUD   GERD  Musculoskeletal   Arthritis  Renal Disease   BPH  Endo/Other  History of cancer and prostate cancer  Body Habitus: Normal       Past Surgical History:   Procedure Laterality Date    COLONOSCOPY      ESOPHAGOGASTRODUODENOSCOPY      FOOT SURGERY      JOINT REPLACEMENT      KNEE ARTHROSCOPY      KNEE SURGERY      PROSTATECTOMY      REPLACEMENT TOTAL KNEE      ROTATOR CUFF REPAIR      SHOULDER SURGERY Bilateral     TOTAL HIP ARTHROPLASTY Bilateral        Physical Exam    Airway   Mallampati: II   TM distance: >3 FB   Neck ROM: full  Cardiovascular - normal   Rhythm: regular   Rate: normalPulmonary - normal   clear to auscultation  Dental - normal        Anesthesia Plan    Plan: spinal    Technique: spinal     Airway: natural airway / supplemental oxygen   ASA 3  Blood Products:     Use of Blood Products Discussed: Yes     Consented to blood products  Anesthetic plan and risks discussed with: patient  Postop Plan:   Patient Disposition: inpatient floor planned admission   Pain Management: spinal

## 2022-09-28 VITALS
OXYGEN SATURATION: 99 % | DIASTOLIC BLOOD PRESSURE: 77 MMHG | HEART RATE: 80 BPM | BODY MASS INDEX: 24.05 KG/M2 | WEIGHT: 168 LBS | RESPIRATION RATE: 16 BRPM | HEIGHT: 70 IN | SYSTOLIC BLOOD PRESSURE: 120 MMHG | TEMPERATURE: 97.7 F

## 2022-09-28 PROBLEM — Z96.651 S/P TKR (TOTAL KNEE REPLACEMENT), RIGHT: Status: ACTIVE | Noted: 2022-09-28

## 2022-09-28 PROBLEM — F41.8 MIXED ANXIETY AND DEPRESSIVE DISORDER: Status: ACTIVE | Noted: 2021-05-12

## 2022-09-28 PROBLEM — M1A.39X0 CHRONIC GOUT DUE TO RENAL IMPAIRMENT OF MULTIPLE SITES WITHOUT TOPHUS: Status: ACTIVE | Noted: 2021-05-12

## 2022-09-28 PROBLEM — H40.9 GLAUCOMA: Status: ACTIVE | Noted: 2020-01-01

## 2022-09-28 PROBLEM — E78.5 HYPERLIPIDEMIA: Status: ACTIVE | Noted: 2021-05-12

## 2022-09-28 PROBLEM — M10.9 GOUT: Status: ACTIVE | Noted: 2021-05-12

## 2022-09-28 PROBLEM — J45.20 MILD INTERMITTENT ASTHMA WITHOUT COMPLICATION: Status: ACTIVE | Noted: 2021-05-12

## 2022-09-28 PROBLEM — R01.1 HEART MURMUR: Status: ACTIVE | Noted: 2021-09-30

## 2022-09-28 LAB
ANION GAP SERPL CALC-SCNC: 6 MEQ/L (ref 3–15)
BUN SERPL-MCNC: 21 MG/DL (ref 8–20)
CALCIUM SERPL-MCNC: 8.7 MG/DL (ref 8.9–10.3)
CHLORIDE SERPL-SCNC: 108 MEQ/L (ref 98–109)
CO2 SERPL-SCNC: 23 MEQ/L (ref 22–32)
CREAT SERPL-MCNC: 1 MG/DL (ref 0.8–1.3)
ERYTHROCYTE [DISTWIDTH] IN BLOOD BY AUTOMATED COUNT: 14 % (ref 11.6–14.4)
GFR SERPL CREATININE-BSD FRML MDRD: >60 ML/MIN/1.73M*2
GLUCOSE SERPL-MCNC: 203 MG/DL (ref 70–99)
HCT VFR BLDCO AUTO: 39.2 % (ref 40.1–51)
HGB BLD-MCNC: 12.8 G/DL (ref 13.7–17.5)
MCH RBC QN AUTO: 31.2 PG (ref 28–33.2)
MCHC RBC AUTO-ENTMCNC: 32.7 G/DL (ref 32.2–36.5)
MCV RBC AUTO: 95.6 FL (ref 83–98)
PDW BLD AUTO: 9.7 FL (ref 9.4–12.4)
PLATELET # BLD AUTO: 175 K/UL (ref 150–350)
POTASSIUM SERPL-SCNC: 4.3 MEQ/L (ref 3.6–5.1)
RBC # BLD AUTO: 4.1 M/UL (ref 4.5–5.8)
SODIUM SERPL-SCNC: 137 MEQ/L (ref 136–144)
WBC # BLD AUTO: 18.79 K/UL (ref 3.8–10.5)

## 2022-09-28 PROCEDURE — 97165 OT EVAL LOW COMPLEX 30 MIN: CPT | Mod: GO

## 2022-09-28 PROCEDURE — 63700000 HC SELF-ADMINISTRABLE DRUG: Performed by: STUDENT IN AN ORGANIZED HEALTH CARE EDUCATION/TRAINING PROGRAM

## 2022-09-28 PROCEDURE — 97535 SELF CARE MNGMENT TRAINING: CPT | Mod: GO

## 2022-09-28 PROCEDURE — 36415 COLL VENOUS BLD VENIPUNCTURE: CPT | Performed by: ORTHOPAEDIC SURGERY

## 2022-09-28 PROCEDURE — 99202 OFFICE O/P NEW SF 15 MIN: CPT | Performed by: STUDENT IN AN ORGANIZED HEALTH CARE EDUCATION/TRAINING PROGRAM

## 2022-09-28 PROCEDURE — 63700000 HC SELF-ADMINISTRABLE DRUG: Performed by: ORTHOPAEDIC SURGERY

## 2022-09-28 PROCEDURE — 63600000 HC DRUGS/DETAIL CODE: Performed by: ORTHOPAEDIC SURGERY

## 2022-09-28 PROCEDURE — 80048 BASIC METABOLIC PNL TOTAL CA: CPT | Performed by: ORTHOPAEDIC SURGERY

## 2022-09-28 PROCEDURE — 97161 PT EVAL LOW COMPLEX 20 MIN: CPT | Mod: GP

## 2022-09-28 PROCEDURE — 97116 GAIT TRAINING THERAPY: CPT | Mod: GP

## 2022-09-28 PROCEDURE — 25800000 HC PHARMACY IV SOLUTIONS: Performed by: ORTHOPAEDIC SURGERY

## 2022-09-28 PROCEDURE — 85027 COMPLETE CBC AUTOMATED: CPT | Performed by: ORTHOPAEDIC SURGERY

## 2022-09-28 RX ORDER — ALBUTEROL SULFATE 90 UG/1
2 INHALANT RESPIRATORY (INHALATION) EVERY 6 HOURS PRN
Status: DISCONTINUED | OUTPATIENT
Start: 2022-09-28 | End: 2022-09-28

## 2022-09-28 RX ORDER — LAMOTRIGINE 100 MG/1
100 TABLET ORAL DAILY
Status: DISCONTINUED | OUTPATIENT
Start: 2022-09-28 | End: 2022-09-28 | Stop reason: HOSPADM

## 2022-09-28 RX ORDER — LISINOPRIL 20 MG/1
40 TABLET ORAL DAILY
Status: DISCONTINUED | OUTPATIENT
Start: 2022-09-28 | End: 2022-09-28 | Stop reason: HOSPADM

## 2022-09-28 RX ORDER — ASPIRIN 81 MG/1
81 TABLET ORAL 2 TIMES DAILY
Qty: 60 TABLET | Refills: 0 | COMMUNITY
Start: 2022-09-28 | End: 2022-10-28

## 2022-09-28 RX ORDER — MONTELUKAST SODIUM 10 MG/1
10 TABLET ORAL NIGHTLY
Status: DISCONTINUED | OUTPATIENT
Start: 2022-09-28 | End: 2022-09-28 | Stop reason: HOSPADM

## 2022-09-28 RX ORDER — LORAZEPAM 0.5 MG/1
0.5 TABLET ORAL DAILY PRN
Status: DISCONTINUED | OUTPATIENT
Start: 2022-09-28 | End: 2022-09-28 | Stop reason: HOSPADM

## 2022-09-28 RX ORDER — HYDROCHLOROTHIAZIDE 12.5 MG/1
12.5 TABLET ORAL DAILY
Status: DISCONTINUED | OUTPATIENT
Start: 2022-09-28 | End: 2022-09-28 | Stop reason: HOSPADM

## 2022-09-28 RX ORDER — DOXYCYCLINE 100 MG/1
100 CAPSULE ORAL 2 TIMES DAILY
Qty: 14 CAPSULE | Refills: 0 | Status: SHIPPED | OUTPATIENT
Start: 2022-09-28 | End: 2022-10-05

## 2022-09-28 RX ORDER — LAMOTRIGINE 25 MG/1
1 TABLET, EXTENDED RELEASE ORAL DAILY
Status: DISCONTINUED | OUTPATIENT
Start: 2022-09-28 | End: 2022-09-28 | Stop reason: HOSPADM

## 2022-09-28 RX ORDER — CETIRIZINE HYDROCHLORIDE 10 MG/1
10 TABLET ORAL DAILY
Status: DISCONTINUED | OUTPATIENT
Start: 2022-09-28 | End: 2022-09-28 | Stop reason: HOSPADM

## 2022-09-28 RX ORDER — LATANOPROST 50 UG/ML
1 SOLUTION/ DROPS OPHTHALMIC NIGHTLY
Status: DISCONTINUED | OUTPATIENT
Start: 2022-09-28 | End: 2022-09-28 | Stop reason: HOSPADM

## 2022-09-28 RX ORDER — ALLOPURINOL 300 MG/1
300 TABLET ORAL DAILY
Status: DISCONTINUED | OUTPATIENT
Start: 2022-09-28 | End: 2022-09-28 | Stop reason: HOSPADM

## 2022-09-28 RX ORDER — ALBUTEROL SULFATE 0.83 MG/ML
2.5 SOLUTION RESPIRATORY (INHALATION) EVERY 6 HOURS PRN
Status: DISCONTINUED | OUTPATIENT
Start: 2022-09-28 | End: 2022-09-28 | Stop reason: HOSPADM

## 2022-09-28 RX ORDER — MELOXICAM 7.5 MG/1
7.5 TABLET ORAL DAILY
Qty: 30 TABLET | Refills: 0 | Status: SHIPPED | OUTPATIENT
Start: 2022-09-28 | End: 2022-10-28

## 2022-09-28 RX ORDER — ROSUVASTATIN CALCIUM 10 MG/1
10 TABLET, COATED ORAL DAILY
Status: DISCONTINUED | OUTPATIENT
Start: 2022-09-28 | End: 2022-09-28 | Stop reason: HOSPADM

## 2022-09-28 RX ORDER — FAMOTIDINE 10 MG/1
40 TABLET ORAL 2 TIMES DAILY
Status: DISCONTINUED | OUTPATIENT
Start: 2022-09-28 | End: 2022-09-28 | Stop reason: HOSPADM

## 2022-09-28 RX ADMIN — OXYCODONE HYDROCHLORIDE 5 MG: 5 TABLET ORAL at 09:46

## 2022-09-28 RX ADMIN — HYDROMORPHONE HYDROCHLORIDE 0.25 MG: 1 INJECTION, SOLUTION INTRAMUSCULAR; INTRAVENOUS; SUBCUTANEOUS at 00:26

## 2022-09-28 RX ADMIN — CETIRIZINE HYDROCHLORIDE 10 MG: 10 TABLET, FILM COATED ORAL at 08:45

## 2022-09-28 RX ADMIN — ROSUVASTATIN CALCIUM 10 MG: 10 TABLET, FILM COATED ORAL at 08:45

## 2022-09-28 RX ADMIN — CEFAZOLIN 2 G: 2 INJECTION, POWDER, FOR SOLUTION INTRAMUSCULAR; INTRAVENOUS at 00:28

## 2022-09-28 RX ADMIN — TRAMADOL HYDROCHLORIDE 50 MG: 50 TABLET, COATED ORAL at 08:45

## 2022-09-28 RX ADMIN — OXYCODONE HYDROCHLORIDE 5 MG: 5 TABLET ORAL at 03:44

## 2022-09-28 RX ADMIN — VANCOMYCIN HYDROCHLORIDE 1 G: 1 INJECTION, POWDER, LYOPHILIZED, FOR SOLUTION INTRAVENOUS at 02:52

## 2022-09-28 RX ADMIN — HYDROMORPHONE HYDROCHLORIDE 0.25 MG: 1 INJECTION, SOLUTION INTRAMUSCULAR; INTRAVENOUS; SUBCUTANEOUS at 06:04

## 2022-09-28 RX ADMIN — ALLOPURINOL 300 MG: 300 TABLET ORAL at 08:45

## 2022-09-28 RX ADMIN — FAMOTIDINE 40 MG: 10 TABLET ORAL at 11:57

## 2022-09-28 RX ADMIN — POLYETHYLENE GLYCOL (3350) 17 G: 17 POWDER, FOR SOLUTION ORAL at 08:46

## 2022-09-28 RX ADMIN — DEXAMETHASONE SODIUM PHOSPHATE 10 MG: 4 INJECTION, SOLUTION INTRA-ARTICULAR; INTRALESIONAL; INTRAMUSCULAR; INTRAVENOUS; SOFT TISSUE at 00:27

## 2022-09-28 RX ADMIN — LISINOPRIL 40 MG: 20 TABLET ORAL at 08:45

## 2022-09-28 RX ADMIN — OXYCODONE HYDROCHLORIDE 5 MG: 5 TABLET ORAL at 10:33

## 2022-09-28 RX ADMIN — SENNOSIDES AND DOCUSATE SODIUM 1 TABLET: 50; 8.6 TABLET ORAL at 08:46

## 2022-09-28 RX ADMIN — ASPIRIN 81 MG CHEWABLE TABLET 81 MG: 81 TABLET CHEWABLE at 08:45

## 2022-09-28 RX ADMIN — KETOROLAC TROMETHAMINE 30 MG: 30 INJECTION, SOLUTION INTRAMUSCULAR at 05:43

## 2022-09-28 RX ADMIN — CEFAZOLIN 2 G: 2 INJECTION, POWDER, FOR SOLUTION INTRAMUSCULAR; INTRAVENOUS at 08:46

## 2022-09-28 RX ADMIN — HYDROCHLOROTHIAZIDE 12.5 MG: 12.5 TABLET ORAL at 08:45

## 2022-09-28 ASSESSMENT — COGNITIVE AND FUNCTIONAL STATUS - GENERAL
DRESSING REGULAR LOWER BODY CLOTHING: 4 - NONE
MOVING TO AND FROM BED TO CHAIR: 4 - NONE
DRESSING REGULAR UPPER BODY CLOTHING: 4 - NONE
STANDING UP FROM CHAIR USING ARMS: 4 - NONE
AFFECT: WFL;ANXIOUS
AFFECT: WFL
EATING MEALS: 4 - NONE
HELP NEEDED FOR BATHING: 4 - NONE
CLIMB 3 TO 5 STEPS WITH RAILING: 4 - NONE
HELP NEEDED FOR PERSONAL GROOMING: 4 - NONE
TOILETING: 4 - NONE
WALKING IN HOSPITAL ROOM: 4 - NONE

## 2022-09-28 NOTE — NURSING NOTE
Patient discharged off unit at this time via wheelchair with all belongings, daughter present for ride home. Cleared by PT OT. No changes since previous assessment. IV removed. No questions/concerns from patient.

## 2022-09-28 NOTE — PROGRESS NOTES
Ortho Daily Progress Note    Subjective     Patient seen and examined at bedside, resting comfortably.  Patient is 1 Day Post-Op s/p R revision TKA w/ Dr. Valdez. No acute events overnight. Pain well controlled. Denies CP, SOB, fever, chills, numbness, tingling. No new complaints at this time.      Objective     Vital signs in last 24 hours:  Temp:  [36.1 °C (97 °F)-36.4 °C (97.5 °F)] 36.4 °C (97.5 °F)  Heart Rate:  [52-74] 63  Resp:  [9-18] 16  BP: (114-139)/(64-77) 119/64      Intake/Output Summary (Last 24 hours) at 9/28/2022 0558  Last data filed at 9/27/2022 2141  Gross per 24 hour   Intake 900 ml   Output 600 ml   Net 300 ml     Intake/Output this shift:  I/O this shift:  In: -   Out: 600 [Urine:600]    Labs:  Results from last 7 days   Lab Units 09/28/22  0301 09/22/22  1136   WBC K/uL 18.79* 9.94   HEMOGLOBIN g/dL 12.8* 14.0   HEMATOCRIT % 39.2* 41.9   PLATELETS K/uL 175 232     Results from last 7 days   Lab Units 09/22/22  1136   INR  1.0   PTT sec 23       Microbiology Results     Procedure Component Value Units Date/Time    Anaerobic Culture / Smear (includes Aerobic Culture) Knee, Right [[id number]] Collected: 09/27/22 1609    Specimen: Tissue from Knee, Right Updated: 09/28/22 0127     Gram Stain Result No WBC Seen      No organisms seen    COVID-19 PCR PAT/Pre-procedural [[id number]]  (Normal) Collected: 09/23/22 0855    Specimen: Nasopharyngeal Swab from Nasopharynx Updated: 09/23/22 1626    Narrative:      The following orders were created for panel order COVID-19 PCR PAT/Pre-procedural.  Procedure                               Abnormality         Status                     ---------                               -----------         ------                     SARS-CoV-2 (COVID-19), PCR[[id number]]   Normal              Final result                 Please view results for these tests on the individual orders.    SARS-CoV-2 (COVID-19), PCR [[id number]]  (Normal) Collected: 09/23/22 0855    Specimen:  Nasopharyngeal Swab from Nasopharynx Updated: 09/23/22 2905     SARS-CoV-2 (COVID-19) Negative    Narrative:      SARS-CoV-2 nucleic acid amplification diagnostic test approved under EUA.            Physical Exam:  Gen: NAD, Cooperative  CV: BCR, toes warm and well perfused  Neuro: Alert  Respiratory: no respiratory distress  MSK:    RLE:  -Dressing CDI  -Compartments soft and compressible  -Minimal incisional TTP  -No pain with ROM of hip/ankle/toes  -Mild pain with ROM of knee  -SILT in sural/saphenous/DP/SP/tibial distributions  -Motor intact to quad/hamstrings/EHL/FHL/TA/peroneals/GSC  -DP/PT Pulses 2+  -Foot wwp, BCR    Assessment/Plan:   Min Trinh is a 57 y.o. old male that is s/p R revision TKA w/ Dr. Valdez on 9/27/2022. Patient is doing well post-operatively.     Plan:  -Pain control  -Ancef 24hr post-op  -WBAT RLE  -DVT PPX: ASA  -PT/OT  -Dispo planning      Tristin Jackson, DO  Orthopedic Surgery, PGY-2  Pager: [phone]

## 2022-09-28 NOTE — PATIENT CARE CONFERENCE
Care Progression Rounds Note  Date: 9/28/2022  Time: 9:03 AM     Patient Name: Min Trinh     Medical Record Number: [id number]   YOB: 1965  Sex: Male      Room/Bed: 4202    Admitting Diagnosis: Pain due to total right knee replacement, initial encounter (CMS/[facility]) [T84.84XA, Z96.651]  Failed total knee replacement, sequela [T84.018S, Z96.659]   Admit Date/Time: 9/27/2022  1:18 PM    Primary Diagnosis: No Principal Problem: There is no principal problem currently on the Problem List. Please update the Problem List and refresh.  Principal Problem: No Principal Problem: There is no principal problem currently on the Problem List. Please update the Problem List and refresh.    GMLOS: pending  Anticipated Discharge Date: 9/28/2022    AM-PAC:  Mobility Score:      Discharge Planning:  Anticipated Discharge Disposition: home with home health    Barriers to Discharge:  None    Comments:       Participants:  nursing, social work/services (CRNP)

## 2022-09-28 NOTE — ASSESSMENT & PLAN NOTE
-completed orthopedic surgery on 09/27/22  -perioperative antibiotics  -pain control  -iv hydration  -incentive spirometry  -pt/ot  -follow up am labs  -dvt ppx as per surgeon: currently on asa 81 mg bid  -follow surgeon's recommendations.

## 2022-09-28 NOTE — PROGRESS NOTES
Patient:  Min Trinh  Location:  [facility] 4B 1672  MRN:  [id number]  Today's date:  9/28/2022   Post therapy session, Pt seated in chair, chair alarm on, call bell in reach, all needs met, RN notified     Min is a 57 y.o. male admitted on 9/27/2022 with Pain due to total right knee replacement, initial encounter (CMS/[facility]) [T84.84XA, Z96.651]  Failed total knee replacement, sequela [T84.018S, Z96.659]. Principal problem is No Principal Problem: There is no principal problem currently on the Problem List. Please update the Problem List and refresh..    Past Medical History  Min has a past medical history of Anxiety, Asthma, Benign heart murmur, Claustrophobia, COPD (chronic obstructive pulmonary disease) (CMS/[facility]), Depression, GERD (gastroesophageal reflux disease), H/O joint replacement, Lumbar herniated disc, Multiple gastric ulcers, OA (osteoarthritis), Pneumonia, Prostate cancer (CMS/[facility]), and Seasonal allergies.    History of Present Illness    s/p R revision TKA w/ Dr. Valdez. Original TKR performed 2019.          Prior Living Environment    Flowsheet Row Most Recent Value   Living Environment Comment lives with dtr, 2SH, 1STE, has full bath on first floor, bedroom 2nd floor, shower stall 2nd floor, full flight of stairs to 2nd floor        Prior Level of Function    Flowsheet Row Most Recent Value   Dominant Hand right   Ambulation independent   Transferring independent   Toileting independent   Bathing independent   Dressing independent   Eating independent   Prior Level of Function Comment Ind AMbulation and ADLs PTA, no AD used, + , self employed        Occupational Profile    Flowsheet Row Most Recent Value   Reason for Services/Referral impaired ADLs   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   Occupational History/Life Experiences self employed    Performance Patterns +   Environmental Supports and Barriers dtr lives with pt, can assist   Patient Goals home           OT Evaluation and  Treatment - 09/28/22 0848        OT Time Calculation    Start Time 0823     Stop Time 0847     Time Calculation (min) 24 min        Session Details    Document Type initial evaluation     Mode of Treatment occupational therapy        General Information    Patient Profile Reviewed yes     Patient/Family/Caregiver Comments/Observations cleared by RN     General Observations of Patient received supine in bed, agreeable to therapy     Existing Precautions/Restrictions weight bearing        Weight-bearing Status    Left LE Weight-Bearing Status weight-bearing as tolerated (WBAT)     Right LE Weight-Bearing Status weight-bearing as tolerated (WBAT)        Cognition/Psychosocial    Affect/Mental Status (Cognition) WFL     Orientation Status (Cognition) oriented x 4     Follows Commands (Cognition) follows multi-step commands;WFL     Cognitive Function WFL     Comment, Cognition good safety/insight into deficits        Hearing Assessment    Hearing Status WFL        Vision Assessment/Intervention    Visual Impairment/Limitations corrective lenses full-time        Sensory Assessment    Left UE Sensory Assessment general sensation;intact     Right UE Sensory Assessment general sensation;intact        Range of Motion (ROM)    Range of Motion ROM is WFL;bilateral upper extremities        Strength (Manual Muscle Testing)    Strength (Manual Muscle Testing) strength is WFL;bilateral upper extremities        Bed Mobility    [place], Supine to Sit independent     Assistive Device none     Comment (Bed Mobility) exit L        Transfers    Transfers toilet transfer        Sit to Stand Transfer    [place], Sit to Stand Transfer modified independence     Assistive Device walker, front-wheeled     Comment from EOB        Stand to Sit Transfer    [place], Stand to Sit Transfer modified independence     Assistive Device walker, front-wheeled     Comment to recliner        Toilet Transfer    Transfer Technique  stand-sit;sit-stand     [place], Toilet Transfer modified independence     Assistive Device walker, front-wheeled     Comment functional mobility to and from bathroom Mod I with RW        Balance    Balance Assessment sitting static balance;sitting dynamic balance;sit to stand dynamic balance;standing static balance;standing dynamic balance     Static Sitting Balance WFL     Dynamic Sitting Balance WFL     Sit to Stand Dynamic Balance WFL     Static Standing Balance WFL     Dynamic Standing Balance WFL     Balance Test Results Functional Reach Test     Comment, Balance with RW        Functional Reach Test    Trial One: Functional Reach Test (in) 15 inches     Trial Two: Functional Reach Test (in) 15 inches     Average (in) 15 inches        Motor Skills    Motor Skills functional endurance     Functional Endurance Good        Upper Body Dressing    Tasks don;pull over garment     [place] independent     Position unsupported sitting     Adaptive Equipment none        Lower Body Dressing    Tasks don;pants/bottoms;socks;shoes/slippers     [place] independent     Position unsupported sitting     Adaptive Equipment none        Grooming    Self-Performance oral care (brushing teeth, cleaning dentures)     [place] modified independence     Position sink side        AM-PAC (TM) - ADL (Current Function)    Putting on and taking off regular lower body clothing? 4 - None     Bathing? 4 - None     Toileting? 4 - None     Putting on/taking off regular upper body clothing? 4 - None     How much help for taking care of personal grooming? 4 - None     Eating meals? 4 - None     AM-PAC (TM) ADL Score 24        Assessment/Plan (OT)    Daily Outcome Statement [facility] 24. Bed mobility Ind, Transfers Mod I with RW, functional mobilty in room Mod I with RW, UB dressing Ind, LB dressing  Ind with no AD needed.  Pt educated on complesatory dressing techniques.  Sink side grooming Mod I. Pt with no functional deficits at  this time.  Pt has necessary DME, no further OT needs. OT will sign off. Pt safe for D/ C home.               OT Assessment/Plan    Flowsheet Row Most Recent Value   OT Recommended Discharge Disposition home at 09/28/2022 0848   Anticipated Equipment Needs At Discharge (OT) none at 09/28/2022 0848   Patient/Family Therapy Goal Statement home at 09/28/2022 0848                    Education Documentation  Rehabilitation Therapy, taught by Babita Sexton OT at 9/28/2022  9:52 AM.  Learner: Patient  Readiness: Eager  Method: Explanation  Response: Verbalizes Understanding  Comment: role of OT, safety with transfers, safe use of RW, ADLs

## 2022-09-28 NOTE — PROGRESS NOTES
Patient: Min Trihn  Location:  [facility] [address] 4B 1822  MRN:  [id number]  Today's date:  9/28/2022     Patient left end of session as received seated in locked recliner w/ +chair alarm, call bell and all needs in reach. Discussed PT evaluation outcomes and recommendations w/ RN.    Min is a 57 y.o. male admitted on 9/27/2022 with Pain due to total right knee replacement, initial encounter (CMS/[facility]) [T84.84XA, Z96.651]  Failed total knee replacement, sequela [T84.018S, Z96.659]. Principal problem is No Principal Problem: There is no principal problem currently on the Problem List. Please update the Problem List and refresh..    Past Medical History  Min has a past medical history of Anxiety, Asthma, Benign heart murmur, Claustrophobia, COPD (chronic obstructive pulmonary disease) (CMS/[facility]), Depression, GERD (gastroesophageal reflux disease), H/O joint replacement, Lumbar herniated disc, Multiple gastric ulcers, OA (osteoarthritis), Pneumonia, Prostate cancer (CMS/[facility]), and Seasonal allergies.    History of Present Illness    s/p R revision TKA w/ Dr. Valdez. Original TKR performed 2019.      PT Vitals    Date/Time Pulse SpO2 Pt Activity O2 Therapy BP BP Location BP Method Pt Position [facility]   09/28/22 0942 87 98 % At rest None (Room air) 113/72 Left upper arm Automatic Sitting MTU   09/28/22 1000 80 99 % -- None (Room air) 120/77 Right upper arm Automatic Sitting JM      PT Pain    Date/Time Pain Type Side/Orientation Location Rating: Rest Rating: Activity Description Interventions [facility]   09/28/22 0942 Pain Assessment right knee 8 -- incisional;throbbing premedicated for activity MTU   09/28/22 1001 Pain Reassessment;Post Activity right knee 8 9 incisional;aching position adjusted;pillow support provided;prescribed exercises encouraged RN made aware MTU          Prior Living Environment    Flowsheet Row Most Recent Value   Living Environment Comment lives with dtr, 2SH, 1STE, has full bath on first floor, bedroom 2nd  floor, shower stall 2nd floor, full flight of stairs to 2nd floor        Prior Level of Function    Flowsheet Row Most Recent Value   Dominant Hand right   Ambulation independent   Transferring independent   Toileting independent   Bathing independent   Dressing independent   Eating independent   Prior Level of Function Comment Ind AMbulation and ADLs PTA, no AD used, + , self employed           PT Evaluation and Treatment - 22 0941        PT Time Calculation    Start Time 0941     Stop Time 1004     Time Calculation (min) 23 min        Session Details    Document Type initial evaluation     Mode of Treatment physical therapy        General Information    Patient Profile Reviewed yes     Onset of Illness/Injury or Date of Surgery 22     Referring Physician Courtney     Patient/Family/Caregiver Comments/Observations Patient cleared by RN for PT evaluation.     General Observations of Patient Received seated in locked recliner w/ +chair alarm, name and  verified on hospital wristband, agreeable to PT evaluation.     Existing Precautions/Restrictions fall;weight bearing        Weight-bearing Status    Left LE Weight-Bearing Status weight-bearing as tolerated (WBAT)     Right LE Weight-Bearing Status weight-bearing as tolerated (WBAT)        Cognition/Psychosocial    Affect/Mental Status (Cognition) WFL;anxious     Orientation Status (Cognition) oriented x 4     Follows Commands (Cognition) WFL;follows multi-step commands     Cognitive Function WFL     Comment, Cognition pleasant and cooperative, anxious due to increased R knee pain after OT evaluation but eager to participate w/ PT and go home, receptive to PT cues, education and recommendations.        Sensory Assessment (Somatosensory)    Sensory Assessment (Somatosensory) sensation intact;bilateral LE     Bilateral LE Sensory Assessment general sensation;light touch awareness;intact        Range of Motion (ROM)    Left Lower Extremity (ROM) left  LE ROM is WFL     Right Lower Extremity (ROM) right LE ROM is WFL except;knee     Knee, Right (ROM) -4 deg extension, 81 deg flexion, limited by post-op pain and swelling        Strength (Manual Muscle Testing)    Hip, Left (Strength) 5/5     Knee, Left (Strength) 5/5     Ankle, Left (Strength) 5/5     Hip, Right (Strength) 4/5     Knee, Right (Strength) 4/5     Ankle, Right (Strength) 5/5        Bed Mobility    Comment (Bed Mobility) not tested, left as received out of bed.        Transfers    Transfers car transfer        Sit to Stand Transfer    [place], Sit to Stand Transfer modified independence     Verbal Cues safety     Assistive Device walker, front-wheeled     Comment from recliner        Stand to Sit Transfer    [place], Stand to Sit Transfer modified independence     Verbal Cues safety     Assistive Device walker, front-wheeled     Comment to recliner, good eccentric control w/ UE support        Car Transfer    Transfer Technique stand-sit;sit-stand     [place], Car Transfer modified independence;verbal cues     Verbal Cues hand placement;safety;technique     Assistive Device walker, front-wheeled     Comment from passenger side        Gait Training    [place], Gait modified independence;verbal cues     Assistive Device walker, front-wheeled     Distance in Feet 145 feet     Pattern (Gait) step-through     Deviations/Abnormal Patterns (Gait) antalgic;sarai decreased;gait speed decreased;step length decreased;weight shifting decreased     Advanced Gait Activity 10 Meter Walk Test (Self-Selected Velocity)     Comment (Gait/Stairs) slow but overall steady antalgic gait pattern w/ decreased RLE weightbearing due to pain, no LOB or knee buckling. limited by R knee pain.        10 Meter Walk Test (Self-Selected Velocity)    Trial One: Ten Meter Walk Test (sec) 12.09 seconds     Trial Two: Ten Meter Walk Test (sec) 11.5 seconds     Assistive Device walker, front-wheeled     Trial One: Gait  Speed (m/s) 0.5 m/s     Trial Two: Gait Speed (m/s) 0.52 m/s     Average Gait Speed (m/s): Two Trials 0.51 m/s        Stairs Training    [place], Stairs modified independence;verbal cues     Assistive Device railing     Handrail Location (Stairs) both sides     Number of Stairs 10     Ascending Stairs Technique step-to-step     Descending Stairs Technique step-to-step     Comment safe foot placement w/ good eccentric control, no R knee buckling or LOB either direction        Safety Issues, Functional Mobility    Impairments Affecting Function (Mobility) pain     Comment, Safety Issues/Impairments (Mobility) no functional safety concerns at this time.        Balance    Balance Assessment sitting static balance;sitting dynamic balance;sit to stand dynamic balance;standing static balance;standing dynamic balance     Static Sitting Balance WFL;sitting in chair     Dynamic Sitting Balance WFL;sitting in chair     Sit to Stand Dynamic Balance WFL;supported     Static Standing Balance WFL;supported     Dynamic Standing Balance WFL;supported     Comment, Balance w/ RW        Motor Skills    Motor Skills functional endurance     Functional Endurance good        Therapeutic Exercise    Therapeutic Exercise --   reviewed HEP ther-ex from post-op knee packet, verbalized understanding of all.       AM-PAC (TM) - Mobility (Current Function)    Turning from your back to your side while in a flat bed without using bedrails? 4 - None     Moving from lying on your back to sitting on the side of a flat bed without using bedrails? 4 - None     Moving to and from a bed to a chair? 4 - None     Standing up from a chair using your arms? 4 - None     To walk in a hospital room? 4 - None     Climbing 3-5 steps with a railing? 4 - None     AM-PAC (TM) Mobility Score 24        Assessment/Plan (PT)    Daily Outcome Statement evaluation complete. Patient demonstrates safety and independence w/ transfers and ambulation, including stair  navigation, w/ RW. Good activity tolerance w/ BP appropriately elevated post-session. Patient is functionally safe to return home w/ family assist as needed, outpatient PT when medically appropriate to progress post-op strength, balance and mobility deficits for return to previous level of independence. PT will sign off at this time as patient has no further skilled needs in this settings, will re-consult as needed if patient status changes and new orders are received. AM-PAC 24/24.     Therapy Frequency evaluation only               PT Assessment/Plan    Flowsheet Row Most Recent Value   PT Recommended Discharge Disposition home with assistance, home with outpatient services at 09/28/2022 0941   Anticipated Equipment Needs at Discharge (PT) none  [has RW] at 09/28/2022 0941   Patient/Family Therapy Goals Statement To go home today. at 09/28/2022 0941                    Education Documentation  Joint Mobility/Strength, taught by Awa Alejandre PT at 9/28/2022 11:36 AM.  Learner: Patient  Readiness: Acceptance  Method: Explanation  Response: Verbalizes Understanding  Comment: PT role and POC, discharge goals and recommendations, gradual activity progression, activity modification, energy conservation, safety and fall prevention in home and hospital settings.    Home Safety, taught by Awa Alejandre PT at 9/28/2022 11:36 AM.  Learner: Patient  Readiness: Acceptance  Method: Explanation  Response: Verbalizes Understanding  Comment: PT role and POC, discharge goals and recommendations, gradual activity progression, activity modification, energy conservation, safety and fall prevention in home and hospital settings.    Energy Conservation, taught by Awa Alejandre PT at 9/28/2022 11:36 AM.  Learner: Patient  Readiness: Acceptance  Method: Explanation  Response: Verbalizes Understanding  Comment: PT role and POC, discharge goals and recommendations, gradual activity progression, activity modification,  energy conservation, safety and fall prevention in home and hospital settings.    Signs/Symptoms, taught by Awa Alejandre PT at 9/28/2022 11:36 AM.  Learner: Patient  Readiness: Acceptance  Method: Explanation  Response: Verbalizes Understanding  Comment: PT role and POC, discharge goals and recommendations, gradual activity progression, activity modification, energy conservation, safety and fall prevention in home and hospital settings.    Risk Factors, taught by Awa Alejandre PT at 9/28/2022 11:36 AM.  Learner: Patient  Readiness: Acceptance  Method: Explanation  Response: Verbalizes Understanding  Comment: PT role and POC, discharge goals and recommendations, gradual activity progression, activity modification, energy conservation, safety and fall prevention in home and hospital settings.

## 2022-09-28 NOTE — PLAN OF CARE
Problem: Adult Inpatient Plan of Care  Goal: Plan of Care Review  Outcome: Progressing  Flowsheets (Taken 9/28/2022 5484)  Progress: improving  Plan of Care Reviewed With: patient  Outcome Summary: OT eval completed. Ind bed mobility, Mod I transfers, Mod I functional mobility, Mod I ADLS.  Pt safe for D/ C home when medically cleared. OT will sign off

## 2022-09-28 NOTE — PLAN OF CARE
Problem: Adult Inpatient Plan of Care  Goal: Plan of Care Review  Outcome: Progressing    Patient's right knee pain managed with both scheduled and PRN medications. Patient able to move RLE; denies numbness/tingling. ACE wrap intact on right lower extremity. Will continue to monitor.

## 2022-09-28 NOTE — NURSING NOTE
Reviewed with patient all d/c instructions. All questions and concerns addressed. Zbigniew removed and patient awaiting ride.   Routed to Dr. Emilio Jc.

## 2022-09-28 NOTE — ASSESSMENT & PLAN NOTE
-continue hctz  -continue lisinopril  -continue home bp meds with parameters. Hold for systolic less than 110 mmhg

## 2022-09-28 NOTE — CONSULTS
Hospital Medicine Service -  IP Medical Consult       CHIEF COMPLAINT     S/p right knee revision arthroplasty     HISTORY OF PRESENT ILLNESS      57 y.o. male with a past medical history of anxiety, asthma, benign heart murmur, depression, gerd, prostate ca, seasonal allergies. Lives at home. Patient is here at [facility] for right revision TKA with Dr. Valdez. Procedure completed on 09/27/22. No reported complications.   The patient had his original knee replacement in 2019 per his verbal report. He states that he had progressive arthritis since that time.   Patient denies any chest pain, wheezing or shortness of breath since procedure. He has some knee pain involved after surgery. He has no new complaints.   [facility] is consulted for medical management.     PAST MEDICAL AND SURGICAL HISTORY      Past Medical History:   Diagnosis Date    Anxiety     Asthma     Benign heart murmur     Claustrophobia     COPD (chronic obstructive pulmonary disease) (CMS/[facility])     Depression     GERD (gastroesophageal reflux disease)     H/O joint replacement     right knee and b/l hips    Lumbar herniated disc     Multiple gastric ulcers     4    OA (osteoarthritis)     Pneumonia     double pneumonia with sepsis    Prostate cancer (CMS/[facility])     Seasonal allergies     when gets sick has asthma like symptoms       Past Surgical History:   Procedure Laterality Date    COLONOSCOPY      ESOPHAGOGASTRODUODENOSCOPY      FOOT SURGERY      JOINT REPLACEMENT      KNEE ARTHROSCOPY      KNEE SURGERY      PROSTATECTOMY      REPLACEMENT TOTAL KNEE      ROTATOR CUFF REPAIR      SHOULDER SURGERY Bilateral     TOTAL HIP ARTHROPLASTY Bilateral        MEDICATIONS      Prior to Admission medications    Medication Sig Start Date End Date Taking? Authorizing Provider   albuterol HFA (VENTOLIN HFA) 90 mcg/actuation inhaler Inhale 2 puffs. 2/12/22  Yes Provider, MD Jw   allopurinoL (ZYLOPRIM) 300 mg tablet Take 1 tablet  by mouth once daily. 1/1/21  Yes Jw Bella MD   aspirin 81 mg enteric coated tablet Take 1 tablet by mouth daily.   Yes Jw Bella MD   baclofen (LIORESAL) 10 mg tablet Take 10 mg by mouth. 1/1/20  Yes Jw Bella MD   cetirizine (ZyrTEC) 10 mg tablet 1 tablet; 10 MG; Once a day   Yes Jw Bella MD   cholecalciferol, vitamin D3, 50 mcg (2,000 unit) capsule Take 1 tablet by mouth once daily.   Yes Jw Bella MD   docusate sodium (COLACE) 100 mg capsule Take 100 mg by mouth 2 times daily.   Yes Jw Bella MD   famotidine (PEPCID) 40 mg tablet Take 40 mg by mouth 2 times daily. 1/1/20 12/16/22 Yes Jw Bella MD   hydrochlorothiazide (MICROZIDE) 12.5 mg capsule TAKE 1 CAPSULE BY MOUTH EVERY DAY IN THE MORNING 1/1/20  Yes ProviderJw MD   lamoTRIgine (LaMICtal) 100 mg tablet Take 100 mg by mouth. 1/1/20  Yes Provider, MD Jw   lamoTRIgine 25 mg tablet extended release 24hr Take 1 tablet by mouth daily. 8/16/22  Yes ProviderJw MD   latanoprost (XALATAN) 0.005 % ophthalmic solution Administer into affected eye(s) daily.   Yes ProviderJw MD   lisinopriL (PRINIVIL) 40 mg tablet Take 40 mg by mouth daily. 9/16/22  Yes Jw Bella MD   LORazepam (ATIVAN) 0.5 mg tablet Take 0.5 mg by mouth. 1/1/20  Yes ProviderJw MD   montelukast (SINGULAIR) 10 mg tablet Take 10 mg by mouth. 9/16/22  Yes Jw Bella MD   rosuvastatin (CRESTOR) 10 mg tablet Take 10 mg by mouth daily. 9/16/22 9/16/23 Yes Jw Bella MD   senna (SENOKOT) 8.6 mg tablet Take 1 tablet by mouth daily.   Yes Provider, MD Jw   tapentadoL (NUCYNTA) 50 mg tablet Take 50 mg by mouth. 1/1/20  Yes Jw Bella MD   triamcinolone (NASACORT) 55 mcg nasal inhaler 1 puff in each nostril; 55 MCG/ACT; Once a day   Yes Provider, MD Jw   ustekinumab (STELARA SUBQ)  1/1/20  Yes Provider, MD Jw        ALLERGIES      Animal dander and Hay fever and allergy relief    FAMILY HISTORY      History reviewed. No pertinent family history.    SOCIAL HISTORY      Social History     Socioeconomic History    Marital status:      Spouse name: None    Number of children: None    Years of education: None    Highest education level: None   Tobacco Use    Smoking status: Former Smoker    Smokeless tobacco: Never Used   Substance and Sexual Activity    Alcohol use: Never    Drug use: Never       REVIEW OF SYSTEMS        Review of Systems  Constitutional: Negative for fatigue and unexpected weight change.   Eyes: Negative for visual disturbance. Mouth no sore throat  Respiratory: Negative for cough and shortness of breath.    Cardiovascular: Negative for chest pain and palpitations.   Gastrointestinal: Negative for abdominal pain, constipation, diarrhea, nausea and vomiting.   Genitourinary: Negative for difficulty urinating. no hematuria no frequency no urgency no discharge  Musculoskeletal: right knee pain  Skin: Negative for rash.   Neurological: Negative for dizziness and headaches.   Hematological: Does not bruise/bleed easily.   Psychiatric/Behavioral: Negative for confusion and dysphoric mood no suicidal ideations          PHYSICAL EXAMINATION      Temp:  [36.1 °C (97 °F)-36.5 °C (97.7 °F)] 36.5 °C (97.7 °F)  Heart Rate:  [52-76] 76  Resp:  [9-18] 16  BP: (114-139)/(64-79) 135/79  Body mass index is 24.45 kg/m².    General exam : appears age stated, well nourished, not in distress  Head: atraumatic, normocephalic  Eyes : PERRLA, EOMI, no pallor, no icterus  ENT: no lesions, oropharynx pink, mucous membranes moist   Neck: supple, no Lymph nodes, no Thyromegaly, no JVD   CVS : normal rate, normal rhythm, S1 and S2 heard, mild systolic murmur heard loudest in apex. No rubs or gallops  Resp:normal accessory muscle usage, clear to auscultation Bilaterally  Abdomen : soft, Nt, BS +, no organomegaly    Extremities : right knee dressings , clean/dry/intact.  MSK: no DJD, no joint swellings, no joint tenderness   Skin: intact, warm, no rash  Neuro: AAO x3, CN 2-12 intact,  motor strength in all extremities, sensations, DTRs, coordination intact.  Psych: normal mood.cooperative      LABS / IMAGING / EKG        Labs  CBC Results       09/28/22 09/22/22 0301 1136    WBC 18.79 9.94    RBC 4.10 4.43    HGB 12.8 14.0    HCT 39.2 41.9    MCV 95.6 94.6    MCH 31.2 31.6    MCHC 32.7 33.4     232        CMP Results       09/28/22 09/22/22 0301 1136     140    K 4.3 4.7    Cl 108 103    CO2 23 29    Glucose 203 92    BUN 21 22    Creatinine 1.0 1.4    Calcium 8.7 9.4    Anion Gap 6 8    AST -- 20    ALT -- 25    Albumin -- 3.7    EGFR >60.0 52.1          Troponin I Results    No lab values to display.       Microbiology Results     Procedure Component Value Units Date/Time    Anaerobic Culture / Smear (includes Aerobic Culture) Knee, Right [[id number]] Collected: 09/27/22 1609    Specimen: Tissue from Knee, Right Updated: 09/28/22 0127     Gram Stain Result No WBC Seen      No organisms seen    COVID-19 PCR PAT/Pre-procedural [[id number]]  (Normal) Collected: 09/23/22 0855    Specimen: Nasopharyngeal Swab from Nasopharynx Updated: 09/23/22 1626    Narrative:      The following orders were created for panel order COVID-19 PCR PAT/Pre-procedural.  Procedure                               Abnormality         Status                     ---------                               -----------         ------                     SARS-CoV-2 (COVID-19), PCR[[id number]]   Normal              Final result                 Please view results for these tests on the individual orders.    SARS-CoV-2 (COVID-19), PCR [[id number]]  (Normal) Collected: 09/23/22 0855    Specimen: Nasopharyngeal Swab from Nasopharynx Updated: 09/23/22 1626     SARS-CoV-2 (COVID-19) Negative    Narrative:      SARS-CoV-2 nucleic acid amplification  diagnostic test approved under EUA.        UA Results    No lab values to display.         Imaging  X-RAY KNEE RIGHT 1 OR 2 VIEWS   Final Result   IMPRESSION: Right knee prosthesis in good position.      COMMENT: AP and lateral views of the right knee were performed.      We have no preoperative studies for comparison.      There is a right knee prosthesis in good position. There are postsurgical   changes in the soft tissues.            ECG/Telemetry  No ekg available to view    ASSESSMENT AND PLAN           S/P TKR (total knee replacement), right  Assessment & Plan  -completed orthopedic surgery on 09/27/22  -perioperative antibiotics  -pain control  -iv hydration  -incentive spirometry  -pt/ot  -follow up am labs  -dvt ppx as per surgeon: currently on asa 81 mg bid  -follow surgeon's recommendations.    Psoriasis  Assessment & Plan  -takes ustekinumab as an outpatient.    Mixed anxiety and depressive disorder  Assessment & Plan  -continue lamotrigine  -continue ativan as needed for breakthrough anxiety    Mild intermittent asthma without complication  Assessment & Plan  -albuterol inhaler as needed  -continue montelukast daily    Hyperlipidemia  Assessment & Plan  -continue rosuvastatin    Essential hypertension  Assessment & Plan  -continue hctz  -continue lisinopril  -continue home bp meds with parameters. Hold for systolic less than 110 mmhg    Heart murmur  Assessment & Plan  -chronic mitral regurgitation  -follow up outpatient     Gout  Assessment & Plan  -continue allopurinol    Glaucoma  Assessment & Plan  -continue latanoprost daily    Gastroesophageal reflux disease without esophagitis  Assessment & Plan  -pepcid 40 mg bid      VTE Assessment: Padua VTE Score: 2  VTE Prophylaxis Plan: as per surgeon on asa 81 mg bid  Code Status: Full Code       Idris Urmila,   9/28/2022

## 2022-09-28 NOTE — PROGRESS NOTES
Pt is from home where he lives in a 2 story house with his children. Pt states he has been home before with homecare through [place]. Referral made to [facility]. They can accept pt. They said they can come out to see pt in the morning. Pt is aware and agreeable. Information is on pts discharge instructions. [facility] following to assist as needed. Dc instructions faxed to them at [phone].    PLAN: dc home with [facility].

## 2022-09-28 NOTE — HOSPITAL COURSE
Mni is a 57 y.o. male admitted on 9/27/2022 with Pain due to total right knee replacement, initial encounter (CMS/[facility]) [T84.84XA, Z96.651]  Failed total knee replacement, sequela [T84.018S, Z96.659]. Principal problem is No Principal Problem: There is no principal problem currently on the Problem List. Please update the Problem List and refresh..    Past Medical History  Min has a past medical history of Anxiety, Asthma, Benign heart murmur, Claustrophobia, COPD (chronic obstructive pulmonary disease) (CMS/[facility]), Depression, GERD (gastroesophageal reflux disease), H/O joint replacement, Lumbar herniated disc, Multiple gastric ulcers, OA (osteoarthritis), Pneumonia, Prostate cancer (CMS/[facility]), and Seasonal allergies.    History of Present Illness    s/p R revision TKA w/ Dr. Valdez. Original TKR performed 2019.

## 2022-09-28 NOTE — PLAN OF CARE
Problem: Mobility Impairment  Goal: Optimal Mobility  Outcome: Progressing     PT evaluation complete. Patient demonstrates safety and independence w/ transfers and ambulation, including stair navigation, w/ RW. Good activity tolerance w/ BP appropriately elevated post-session. Patient is functionally safe to return home w/ family assist as needed, outpatient PT when medically appropriate to progress post-op strength, balance and mobility deficits for return to previous level of independence. PT will sign off at this time as patient has no further skilled needs in this settings, will re-consult as needed if patient status changes and new orders are received. AM-PAC 24/24.

## 2022-09-29 NOTE — ANESTHESIA POSTPROCEDURE EVALUATION
Patient: Min Trinh    Procedure Summary     Date: 09/27/22 Room / Location:  OR 6 / PH OR    Anesthesia Start: 1528 Anesthesia Stop: 1742    Procedure: RIGHT KNEE TOTAL REVISION (Right Knee) Diagnosis:       Pain due to total right knee replacement, initial encounter (CMS/[facility])      (Painful Total Knee T84.84xa)    Surgeons: Himanshu Valdez MD Responsible Provider: Laina Mancilla MD    Anesthesia Type: spinal ASA Status: 3          Anesthesia Type: spinal  PACU Vitals  9/27/2022 1739 - 9/27/2022 1839      9/27/2022  1740 9/27/2022  1745 9/27/2022  1800 9/27/2022  1815    BP: 127/65 120/66 118/67 117/68    Temp: 36.1 °C (97 °F) -- -- --    Pulse: 66 64 56 54    Resp: 12 14 12 12    SpO2: 99 % 99 % 99 % 97 %              9/27/2022  1830             BP: 139/77       Temp: --       Pulse: 58       Resp: 18       SpO2: 99 %               Anesthesia Post Evaluation    Pain management: adequate  Patient participation: complete - patient participated  Level of consciousness: awake and alert  Cardiovascular status: acceptable  Airway Patency: adequate  Respiratory status: acceptable  Hydration status: acceptable  Anesthetic complications: no

## 2022-10-02 LAB
GRAM STN SPEC: NORMAL
GRAM STN SPEC: NORMAL
MICROORGANISM SPEC CULT: NORMAL

## 2025-05-21 ENCOUNTER — TELEPHONE (OUTPATIENT)
Age: 60
End: 2025-05-21

## 2025-05-21 NOTE — TELEPHONE ENCOUNTER
Pt calling asking if when we do our colon/egd does [facility] have anesthesiologist or is it outside provider? Specifically [place] location. I called [facility] and spoke w/ Edilma on this. Per Edilma we hire an outside contractor called [employer] [facility] and their ph # is [phone] Option 1. I gave pt this information and he will contact them.

## 2025-06-30 ENCOUNTER — HOSPITAL ENCOUNTER (OUTPATIENT)
Dept: HOSPITAL 99 - RCS | Age: 60
End: 2025-06-30
Payer: COMMERCIAL

## 2025-06-30 DIAGNOSIS — I35.1: ICD-10-CM

## 2025-06-30 DIAGNOSIS — I34.0: Primary | ICD-10-CM

## 2025-07-28 ENCOUNTER — HOSPITAL ENCOUNTER (OUTPATIENT)
Dept: HOSPITAL 99 - RCS | Age: 60
End: 2025-07-28
Payer: COMMERCIAL

## 2025-07-28 DIAGNOSIS — I42.2: Primary | ICD-10-CM

## 2025-07-28 DIAGNOSIS — I49.8: ICD-10-CM

## (undated) DEVICE — GOWN SURG X-LARGE MICROCOOL

## (undated) DEVICE — SOLN IRRIG .9%SOD 1000ML

## (undated) DEVICE — SOLN IRRIG STER WATER 1000ML

## (undated) DEVICE — MANIFOLD FOUR PORT NEPTUNE

## (undated) DEVICE — CUFF TOURNIQUET DISP 34 X 4

## (undated) DEVICE — KIT LEG STABILIZATION SPIDER

## (undated) DEVICE — GOWN SURGICAL MICROCOOL IMPERVIOUS XXL

## (undated) DEVICE — PACK DRAPE TABLE

## (undated) DEVICE — DRAPE SPIDER2 SWITCH

## (undated) DEVICE — BLADE SAGGITAL FLARED 2108-382-3

## (undated) DEVICE — BLADE SCALPEL #10

## (undated) DEVICE — PACK UNIVERSAL TOTAL JOINT

## (undated) DEVICE — VEST STERILE

## (undated) DEVICE — SET HANDPIECE INTERPULSE

## (undated) DEVICE — NEEDLE DISP HYPO 18GX1-1/2IN

## (undated) DEVICE — PACK RFID KNEE ADD ON

## (undated) DEVICE — PENEVAC1 NONSTICK SMOKE EVAC

## (undated) DEVICE — CLOTH PREPPING SAGE 2% CHG 2/PK

## (undated) DEVICE — GLOVE LINER PROTEXIS 9.0 PI BLUE

## (undated) DEVICE — HOOD FLYTE SURGICOOL

## (undated) DEVICE — SPONGE LAP 18X18 SAFE-T RFID ENHANCED XRAY

## (undated) DEVICE — NEEDLE DISP SPINAL 22GX3-1/2IN

## (undated) DEVICE — PAD GROUND ELECTROSURGICAL W/CORD

## (undated) DEVICE — BAG DECANTER

## (undated) DEVICE — KIT TOTAL HIP PREP - IM

## (undated) DEVICE — BANDAGE FLEX MASTER  6IN

## (undated) DEVICE — KIT CEMENT MIXING CARTRIDGE AND NOZZLE

## (undated) DEVICE — GLOVE PROTEXIS PI ORTHO 9.0

## (undated) DEVICE — SUCTION 18FR FRAZIER DISPOSABLE

## (undated) DEVICE — TRAY WET SKIN PREP PREMIUM

## (undated) DEVICE — CONTAINER SPECIMEN STERILE 5OZ

## (undated) DEVICE — Device

## (undated) DEVICE — BLADE, ROUND GIGLI SAW

## (undated) DEVICE — SUTURE QUILL PDO 0 RA-1067Q

## (undated) DEVICE — SUTURE QUILL 2 PDO RX-2066Q

## (undated) DEVICE — APPLICATOR CHLORAPREP 26ML ORANGE TINT

## (undated) DEVICE — ***USE 57698*** SLEEVE FLOWTRON DVT CALF SINGLE USE

## (undated) DEVICE — DRAPE LARGE REVERSE FOLD

## (undated) DEVICE — SYRINGE DISP LUER-LOK 30 CC